# Patient Record
Sex: MALE | Race: WHITE | NOT HISPANIC OR LATINO | Employment: FULL TIME | URBAN - NONMETROPOLITAN AREA
[De-identification: names, ages, dates, MRNs, and addresses within clinical notes are randomized per-mention and may not be internally consistent; named-entity substitution may affect disease eponyms.]

---

## 2024-06-30 ENCOUNTER — APPOINTMENT (OUTPATIENT)
Dept: RADIOLOGY | Facility: CLINIC | Age: 34
End: 2024-06-30
Payer: COMMERCIAL

## 2024-06-30 ENCOUNTER — OFFICE VISIT (OUTPATIENT)
Dept: URGENT CARE | Facility: CLINIC | Age: 34
End: 2024-06-30
Payer: COMMERCIAL

## 2024-06-30 DIAGNOSIS — M25.511 ACUTE PAIN OF RIGHT SHOULDER: ICD-10-CM

## 2024-06-30 DIAGNOSIS — M25.511 ACUTE PAIN OF RIGHT SHOULDER: Primary | ICD-10-CM

## 2024-06-30 PROCEDURE — 73030 X-RAY EXAM OF SHOULDER: CPT

## 2024-06-30 PROCEDURE — 99213 OFFICE O/P EST LOW 20 MIN: CPT

## 2024-06-30 RX ORDER — METHYLPREDNISOLONE 4 MG/1
TABLET ORAL
Qty: 21 TABLET | Refills: 0 | Status: SHIPPED | OUTPATIENT
Start: 2024-06-30

## 2024-06-30 RX ORDER — LISDEXAMFETAMINE DIMESYLATE 60 MG/1
CAPSULE ORAL
COMMUNITY

## 2024-06-30 RX ORDER — ARIPIPRAZOLE 15 MG/1
15 TABLET ORAL EVERY MORNING
COMMUNITY

## 2024-06-30 RX ORDER — ESCITALOPRAM OXALATE 10 MG/1
10 TABLET ORAL EVERY MORNING
COMMUNITY

## 2024-06-30 RX ORDER — LISINOPRIL AND HYDROCHLOROTHIAZIDE 12.5; 1 MG/1; MG/1
1 TABLET ORAL DAILY
COMMUNITY
Start: 2024-03-04 | End: 2025-03-04
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
## 2024-06-30 NOTE — PATIENT INSTRUCTIONS
"Take prescribed medication as instructed.  Avoid ibuprofen while taking steroid.  May take Tylenol.  May resume ibuprofen/NSAIDs once done with steroid pack.  Continue icing 4-5 times a day.  Try to keep it mobile within your limits of pain to prevent stiffness and worsening of symptoms.  Follow-up with orthopedics as discussed.  Go to the emergency room if any symptoms worsen.  Follow up with PCP in 3-5 days.  Proceed to  ER if symptoms worsen.    If tests are performed, our office will contact you with results only if changes need to made to the care plan discussed with you at the visit. You can review your full results on [facility].  Patient Education     Shoulder pain   The Basics   Written by the doctors and editors at [facility]   What are the parts of the shoulder? -- The shoulder joint is made up of the upper arm bone, collarbone, and shoulder blade. It includes muscles, ligaments, tendons, and bursae (figure 1). All of these parts work together to help the shoulder move comfortably in different directions.  What can cause shoulder pain? -- Shoulder pain can happen when you damage or injure any of the different parts of the shoulder.  Different conditions can cause shoulder pain. They include:   Bursitis - This is a condition that can cause pain or swelling next to a joint. A \"bursa\" is a small fluid-filled sac that sits near a bone. It cushions and protects nearby tissues when they rub on or slide over bones. Bursitis is when a bursa gets irritated and swollen.   Frozen shoulder - This is a condition that causes the shoulder to be stiff, painful, and hard to move. If you have a frozen shoulder, the tissue around the shoulder joint gets thick and tight. This might happen after a shoulder gets injury or surgery.   Rotator cuff injury - The rotator cuff is made up of 4 shoulder muscles and their tendons. Tendons are strong bands of tissue that connect muscles to bones. Rotator cuff injuries cause pain " "in your shoulder and sometimes in your upper arm.   Shoulder impingement - This happens when a muscle, tendon, or bursa gets squeezed between bones.    shoulder - This happens when certain ligaments tear or get stretched too much. Ligaments are strong bands of tissue that connect bone to bone. The most common causes of a  shoulder are falling on the shoulder or getting hit in the shoulder. A  shoulder can be mild or severe, depending on how many ligaments are torn.   Osteoarthritis - This is a common condition that often comes with age. The cartilage in the joints wears down, causing the bones to rub against each other. This can cause pain, stiffness, and swelling in the shoulder joints.  Will I need tests? -- Maybe. Your doctor or nurse will talk with you and do an exam. They might also do an imaging test of your shoulder such as an X-ray, MRI, or ultrasound. Imaging tests create pictures of the inside of the body.  How is shoulder pain treated? -- Many causes of shoulder pain get better on their own. But it can take weeks to months to heal completely.  Your doctor might recommend:   Pain-relieving medicines called \"nonsteroidal anti-inflammatory drugs\" (NSAIDs) - These include ibuprofen (sample brand names: Advil, Motrin) and naproxen (sample brand name: Aleve). They can reduce pain and swelling.   Exercises and stretches - It can help to work with a physical therapist (exercise expert). They can teach you gentle stretches and exercises to help with your symptoms. Follow the physical therapist's advice for how often and when to do the exercises.   Steroid injections - Steroid medicines help reduce inflammation. Doctors can inject steroids into the shoulder to help reduce symptoms.   Surgery - Surgery might be an option if other treatments do not work and you have had symptoms for a long time.  Is there anything I can do on my own to feel better?    Rest your shoulder - Avoid lifting " things, reaching overhead or across your chest, or sleeping on the shoulder that hurts. If your doctor gave you a sling to support your arm, follow instructions for using it. Or you might get a bandage that goes around your shoulders and upper back instead.   Take medicine to reduce pain and swelling - To treat pain, you can take acetaminophen (sample brand name: Tylenol). To treat pain and swelling, you can take ibuprofen (sample brand names: Advil, Motrin).   Prop your shoulder on pillows - Keep it raised above the level of your heart. This can help with pain and swelling.   Ice your shoulder - Put a cold gel pack, bag of ice, or bag of frozen vegetables on the injured area every 1 to 2 hours, for 15 minutes each time. Put a thin towel between the ice (or other cold object) and your skin. Use the ice (or other cold object) for at least 6 hours after your injury. Some people find it helpful to ice longer, even up to 2 days after their injury. Ice can also be helpful after doing shoulder exercises.   Put heat on the area to reduce pain and stiffness - Do not use heat for more than 20 minutes at a time. Also, do not use anything too hot that could burn your skin.   Do pendulum exercises to keep your shoulder from getting too stiff (figure 2):   Let your arm relax and hang down while you sit or stand. Gently move your arm:   Back and forth at your side   Side to side across your body   Around in small circles   Do this exercise for 5 minutes, 1 or 2 times a day.   Start slowly, and make the exercises harder over time. For example, make small circles with your arm at first. Over time, make bigger circles or hold weights in your hand.   Your doctor, nurse, or physical therapist can show you how to do other exercises to strengthen the muscles around your shoulder. They will tell you when to start them and how often to do them.   Before exercising your shoulder, warm up the muscles first. You can do this by taking a hot  "shower or bath, or using a heating pad. Some soreness is normal. If you have sharp, tearing, or worsening pain, stop what you're doing and let your doctor or nurse know.  When should I call the doctor? -- Call for emergency help right away (in the [place] and [place], call 9-1-1) if:   You have shoulder pain and start to have trouble breathing or bad chest discomfort.  Call the doctor or nurse for advice if:   You have very bad pain that is not helped by medicines.   Your hand or arm becomes weak or swollen.   Your fingers are numb, tingly, or blue or gray in color.  All topics are updated as new evidence becomes available and our peer review process is complete.  This topic retrieved from [employer] on: Apr 25, 2024.  Topic 226913 Version 3.0  Release: 32.4.2 - C32.114  © 2024 [employer] and/or its affiliates. All rights reserved.  figure 1: Parts of the shoulder     These are the different parts of the shoulder as viewed from the front (A) and the back (B). The shoulder joint is made up of the upper arm bone, collarbone, and shoulder blade. Ligaments, muscles, and tendons help hold the joint in place and allow you to move your arm. A \"bursa\" is a small fluid-filled sac that sits near a bone. It cushions and protects nearby tissues when they rub on or slide over bones.  Graphic 210317 Version 1.0  figure 2: Pendulum exercises     Letyour arm relax and hang down while you sit or stand. Gently move your arm backand forth at your side, then side to side across your body, and then around insmall circles. Do this exercise for 5 minutes, 1 or 2 times a day. Youcan make this exercise harder by making bigger movements or holding a smallweight in your hand.  Graphic 531496 Version 1.0  Consumer Information Use and Disclaimer   Disclaimer: This generalized information is a limited summary of diagnosis, treatment, and/or medication information. It is not meant to be comprehensive and should be used as a tool to help the user " understand and/or assess potential diagnostic and treatment options. It does NOT include all information about conditions, treatments, medications, side effects, or risks that may apply to a specific patient. It is not intended to be medical advice or a substitute for the medical advice, diagnosis, or treatment of a health care provider based on the health care provider's examination and assessment of a patient's specific and unique circumstances. Patients must speak with a health care provider for complete information about their health, medical questions, and treatment options, including any risks or benefits regarding use of medications. This information does not endorse any treatments or medications as safe, effective, or approved for treating a specific patient. Up[employer]Date, Inc[employer] and its affiliates disclaim any warranty or liability relating to this information or the use thereof.The use of this information is governed by the Terms of Use, available at https://www.[employer].com/en/know/clinical-effectiveness-terms. 2024© Up[employer]Date, Inc. and its affiliates and/or licensors. All rights reserved.  Copyright   © 2024 [employer]Date, Inc. and/or its affiliates. All rights reserved.

## 2024-06-30 NOTE — LETTER
June 30, 2024     Patient: Jatinder Araiza   YOB: 1990   Date of Visit: 6/30/2024       To Whom it May Concern:    Jatinder Araiza was seen in my clinic on 6/30/2024. Please excuse from work on 7/1/2024.    If you have any questions or concerns, please don't hesitate to call.         Sincerely,          Eladio Vaca PA-C        CC: No Recipients

## 2025-02-04 ENCOUNTER — OFFICE VISIT (OUTPATIENT)
Dept: URGENT CARE | Facility: MEDICAL CENTER | Age: 35
End: 2025-02-04
Payer: COMMERCIAL

## 2025-02-04 ENCOUNTER — APPOINTMENT (OUTPATIENT)
Dept: URGENT CARE | Facility: MEDICAL CENTER | Age: 35
End: 2025-02-04
Payer: COMMERCIAL

## 2025-02-04 VITALS
HEART RATE: 98 BPM | OXYGEN SATURATION: 98 % | DIASTOLIC BLOOD PRESSURE: 98 MMHG | HEIGHT: 72 IN | TEMPERATURE: 97.3 F | WEIGHT: 221 LBS | RESPIRATION RATE: 18 BRPM | SYSTOLIC BLOOD PRESSURE: 139 MMHG | BODY MASS INDEX: 29.93 KG/M2

## 2025-02-04 DIAGNOSIS — A08.4 VIRAL GASTROENTERITIS: Primary | ICD-10-CM

## 2025-02-04 PROCEDURE — 99213 OFFICE O/P EST LOW 20 MIN: CPT

## 2025-02-04 NOTE — LETTER
February 4, 2025     Patient: Jatinder Araiza   YOB: 1990   Date of Visit: 2/4/2025       To Whom it May Concern:    Jatinder Araiza was seen in my clinic on 2/4/2025. He may return to work on 02/06/2025 provided his GI symptoms have improved/resolved .    If you have any questions or concerns, please don't hesitate to call.         Sincerely,          LEIGHTON Sales        CC: No Recipients

## 2025-02-04 NOTE — PROGRESS NOTES
[facility]        NAME: Jatinder Araiza is a 35 y.o. male  : 1990    MRN: [id number]  DATE: 2025  TIME: 3:58 PM    Assessment and Plan   Viral gastroenteritis [A08.4]  1. Viral gastroenteritis              Patient Instructions       Follow up with PCP in 3-5 days.  Proceed to  ER if symptoms worsen.    If tests are performed, our office will contact you with results only if changes need to made to the care plan discussed with you at the visit. [facility]    Chief Complaint     Chief Complaint   Patient presents with    Generalized Body Aches     Pt complains of symptoms for 3 days, Body aches, vomiting, diarrhea         History of Present Illness       Patient presents with generalized body aches, fatigue x3-4 days. Last night started with vomiting. He has not had any vomiting since yesterday. He has not had any noted fevers, but has been sweating. At home he has taking zofran which did not help his nausea and he also took ibuprofen for the body aches. He reports his daughter has had vomiting and diarrhea x3 days. Son also recently been having some nausea as well. No recent travel. No recent Abx use. He has been drinking fluids mostly water, but has not tried to eat anything.       Symptoms consistent with norovirus type illness.  Given recent increase in incidences locally discussed importance of handwashing and cleaning.  Discussed importance of increased oral intake of fluids and closely monitoring of symptoms. Strict ER precautions also discussed. Work noted provided to have today and tomorrow off with expectations that should symptoms lack improvement he should consider re-evaluation.        Review of Systems   Review of Systems   Constitutional:  Positive for appetite change and fatigue. Negative for chills and fever.   HENT:  Negative for congestion, postnasal drip, rhinorrhea, sinus pressure, sinus pain, sore throat and trouble  swallowing.    Respiratory:  Negative for cough and shortness of breath.    Gastrointestinal:  Positive for abdominal pain, nausea and vomiting. Negative for constipation and diarrhea.        Last night had some abdominal discomfort.    Musculoskeletal:  Positive for myalgias.   Skin:  Negative for color change and rash.   Neurological:  Positive for headaches. Negative for dizziness and light-headedness.         Current Medications       Current Outpatient Medications:     ARIPiprazole (ABILIFY) 15 mg tablet, Take 15 mg by mouth every morning (Patient not taking: Reported on 2/4/2025), Disp: , Rfl:     escitalopram (LEXAPRO) 10 mg tablet, Take 10 mg by mouth every morning (Patient not taking: Reported on 2/4/2025), Disp: , Rfl:     lisdexamfetamine (VYVANSE) 60 MG capsule, take 1 capsule by mouth every day in the morning (Patient not taking: Reported on 2/4/2025), Disp: , Rfl:     lisinopril-hydrochlorothiazide (PRINZIDE,ZESTORETIC) 10-12.5 MG per tablet, Take 1 tablet by mouth daily (Patient not taking: Reported on 2/4/2025), Disp: , Rfl:     methylPREDNISolone 4 MG tablet therapy pack, Use as directed on package (Patient not taking: Reported on 2/4/2025), Disp: 21 tablet, Rfl: 0    Current Allergies     Allergies as of 02/04/2025 - Reviewed 02/04/2025   Allergen Reaction Noted    Pollen extract Rash 02/13/2023    Ziprasidone Rash 06/25/2018            The following portions of the patient's history were reviewed and updated as appropriate: allergies, current medications, past family history, past medical history, past social history, past surgical history and problem list.     History reviewed. No pertinent past medical history.    History reviewed. No pertinent surgical history.    History reviewed. No pertinent family history.      Medications have been verified.        Objective   /98   Pulse 98   Temp (!) 97.3 °F (36.3 °C)   Resp 18   Ht 6' (1.829 m)   Wt 100 kg (221 lb)   SpO2 98%   BMI 29.97  kg/m²        Physical Exam     Physical Exam  Vitals and nursing note reviewed.   Constitutional:       General: He is awake. He is not in acute distress.     Appearance: Normal appearance. He is well-developed, well-groomed and normal weight. He is not ill-appearing.   HENT:      Head: Normocephalic and atraumatic.      Nose: Nose normal.      Mouth/Throat:      Lips: Pink.      Mouth: Mucous membranes are moist.   Eyes:      Extraocular Movements: Extraocular movements intact.      Conjunctiva/sclera: Conjunctivae normal.      Pupils: Pupils are equal, round, and reactive to light.   Cardiovascular:      Rate and Rhythm: Normal rate and regular rhythm.      Pulses: Normal pulses.      Heart sounds: Normal heart sounds.   Pulmonary:      Effort: Pulmonary effort is normal.      Breath sounds: Normal breath sounds. No decreased breath sounds, wheezing, rhonchi or rales.   Abdominal:      General: Abdomen is flat. Bowel sounds are increased.      Palpations: Abdomen is soft.      Tenderness: There is no abdominal tenderness.   Musculoskeletal:         General: Normal range of motion.      Cervical back: Full passive range of motion without pain, normal range of motion and neck supple.   Skin:     General: Skin is warm and dry.      Capillary Refill: Capillary refill takes less than 2 seconds.      Findings: No rash.   Neurological:      General: No focal deficit present.      Mental Status: He is alert and oriented to person, place, and time. Mental status is at baseline.      GCS: GCS eye subscore is 4. GCS verbal subscore is 5. GCS motor subscore is 6.   Psychiatric:         Mood and Affect: Mood normal.         Behavior: Behavior normal. Behavior is cooperative.

## 2025-03-08 ENCOUNTER — OFFICE VISIT (OUTPATIENT)
Dept: URGENT CARE | Facility: MEDICAL CENTER | Age: 35
End: 2025-03-08
Payer: COMMERCIAL

## 2025-03-08 VITALS
SYSTOLIC BLOOD PRESSURE: 138 MMHG | TEMPERATURE: 97.7 F | OXYGEN SATURATION: 98 % | RESPIRATION RATE: 14 BRPM | HEIGHT: 72 IN | HEART RATE: 86 BPM | WEIGHT: 225.4 LBS | BODY MASS INDEX: 30.53 KG/M2 | DIASTOLIC BLOOD PRESSURE: 78 MMHG

## 2025-03-08 DIAGNOSIS — Z20.818 EXPOSURE TO STREP THROAT: ICD-10-CM

## 2025-03-08 DIAGNOSIS — J02.9 SORE THROAT: Primary | ICD-10-CM

## 2025-03-08 PROBLEM — K76.0 FATTY LIVER: Status: ACTIVE | Noted: 2022-07-25

## 2025-03-08 PROBLEM — I10 PRIMARY HYPERTENSION: Status: ACTIVE | Noted: 2024-07-01

## 2025-03-08 PROBLEM — M54.50 CHRONIC MIDLINE LOW BACK PAIN WITHOUT SCIATICA: Status: ACTIVE | Noted: 2024-11-05

## 2025-03-08 PROBLEM — G89.29 CHRONIC MIDLINE LOW BACK PAIN WITHOUT SCIATICA: Status: ACTIVE | Noted: 2024-11-05

## 2025-03-08 PROBLEM — F31.12 BIPOLAR 1 DISORDER WITH MODERATE MANIA (HCC): Status: ACTIVE | Noted: 2020-03-18

## 2025-03-08 PROBLEM — F19.11 HISTORY OF SUBSTANCE ABUSE (HCC): Status: ACTIVE | Noted: 2024-11-05

## 2025-03-08 PROBLEM — F41.1 GENERALIZED ANXIETY DISORDER: Status: ACTIVE | Noted: 2020-03-18

## 2025-03-08 LAB — S PYO AG THROAT QL: NEGATIVE

## 2025-03-08 PROCEDURE — 99213 OFFICE O/P EST LOW 20 MIN: CPT

## 2025-03-08 PROCEDURE — 87880 STREP A ASSAY W/OPTIC: CPT

## 2025-03-08 PROCEDURE — 87070 CULTURE OTHR SPECIMN AEROBIC: CPT

## 2025-03-08 NOTE — PATIENT INSTRUCTIONS
You may take over the counter Tylenol (Acetaminophen) and/or Motrin (Ibuprofen) as needed, as directed on packaging. Be sure to get plenty of rest, and drinking fluids to remain hydrated.     Please follow up with your primary provider in the next several days. Should you have any worsening of symptoms, or lack of improvement please be re-evaluated. If needed for significant concerns, consider 911 or ER evaluation.     The unnecessary use of antibiotics can have harmful affect, unwanted side-effects and can lead to antibiotic resistant bacteria in the future. You are being treated today for a viral illness. Viral illnesses do not require antibiotics, and are treated symptomatically.   According to the Centers for Disease Control and Prevention, about one-third of antibiotic use in the outpatient setting, is not needed nor appropriate. Antibiotics treat infections caused by bacteria. But they don't treat infections caused by viruses (viral infections). For example, an antibiotic is the correct treatment for strep throat, which is caused by bacteria. But it's not the right treatment for most sore throats, which are caused by viruses.By being proactive and treating your individual symptoms, this may help you feel better.     You may have had testing done today which when completed and resulted may change the course of your treatment. It is at that time that if a change in your treatment is necessary that you will hear from our office. I would also recommend you follow up with your primary care provider in the next few days.

## 2025-03-08 NOTE — PROGRESS NOTES
[facility]        NAME: Jatinder Araiza is a 35 y.o. male  : 1990    MRN: [id number]  DATE: 2025  TIME: 9:15 AM    Assessment and Plan   Sore throat [J02.9]  1. Sore throat  POCT rapid ANTIGEN strepA    Throat culture    Throat culture      2. Exposure to strep throat  Throat culture    Throat culture            Patient Instructions       Follow up with PCP in 3-5 days.  Proceed to  ER if symptoms worsen.    If tests are performed, our office will contact you with results only if changes need to made to the care plan discussed with you at the visit. You can review your full results on [facility].    Chief Complaint     Chief Complaint   Patient presents with   • Sore Throat     Was exposed to strep - daughter tested positive Wednesday. Sore throat.         History of Present Illness       Patient presents with sore throat with reports of 14yo daughter recently testing positive for strep. Patient started yesterday with sore throat. He has not had any fevers. Has had some discomfort in b/l ears, but denies any other URI symptoms. At home he has not taken anything for his symptoms.         Review of Systems   Review of Systems   Constitutional:  Negative for chills and fever.   HENT:  Positive for sore throat. Negative for congestion, rhinorrhea and trouble swallowing.    Respiratory:  Negative for cough and shortness of breath.    Gastrointestinal:  Negative for abdominal pain, constipation, diarrhea, nausea and vomiting.   Skin:  Negative for color change and rash.   Neurological:  Negative for dizziness, light-headedness and headaches.         Current Medications     No current outpatient medications on file.    Current Allergies     Allergies as of 2025 - Reviewed 2025   Allergen Reaction Noted   • Pollen extract Rash 2023   • Ziprasidone Rash 2018            The following portions of the patient's history were reviewed and updated as appropriate: allergies,  current medications, past family history, past medical history, past social history, past surgical history and problem list.     Past Medical History:   Diagnosis Date   • Hypertension        History reviewed. No pertinent surgical history.    Family History   Problem Relation Age of Onset   • Cancer Mother    • No Known Problems Father          Medications have been verified.        Objective   /78   Pulse 86   Temp 97.7 °F (36.5 °C)   Resp 14   Ht 6' (1.829 m)   Wt 102 kg (225 lb 6.4 oz)   SpO2 98%   BMI 30.57 kg/m²        Physical Exam     Physical Exam  Vitals and nursing note reviewed.   Constitutional:       General: He is awake. He is not in acute distress.     Appearance: Normal appearance. He is well-developed and normal weight. He is not ill-appearing.   HENT:      Head: Normocephalic and atraumatic.      Right Ear: Tympanic membrane, ear canal and external ear normal.      Left Ear: Tympanic membrane, ear canal and external ear normal.      Nose: Nose normal.      Mouth/Throat:      Lips: Pink.      Mouth: Mucous membranes are moist.      Pharynx: Oropharynx is clear. Uvula midline. Posterior oropharyngeal erythema present. No pharyngeal swelling, oropharyngeal exudate or postnasal drip.      Tonsils: No tonsillar exudate. 0 on the right. 0 on the left.   Eyes:      Extraocular Movements: Extraocular movements intact.      Conjunctiva/sclera: Conjunctivae normal.      Pupils: Pupils are equal, round, and reactive to light.   Cardiovascular:      Rate and Rhythm: Normal rate and regular rhythm.      Pulses: Normal pulses.      Heart sounds: Normal heart sounds.   Pulmonary:      Effort: Pulmonary effort is normal.      Breath sounds: Normal breath sounds.   Abdominal:      General: Abdomen is flat. Bowel sounds are normal.      Palpations: Abdomen is soft.   Musculoskeletal:         General: Normal range of motion.      Cervical back: Full passive range of motion without pain, normal range of  motion and neck supple.   Skin:     General: Skin is warm and dry.      Capillary Refill: Capillary refill takes less than 2 seconds.      Findings: No rash.   Neurological:      General: No focal deficit present.      Mental Status: He is alert and oriented to person, place, and time. Mental status is at baseline.   Psychiatric:         Mood and Affect: Mood normal.         Behavior: Behavior normal. Behavior is cooperative.

## 2025-03-09 LAB — BACTERIA THROAT CULT: NORMAL

## 2025-03-10 ENCOUNTER — RESULTS FOLLOW-UP (OUTPATIENT)
Dept: URGENT CARE | Facility: MEDICAL CENTER | Age: 35
End: 2025-03-10

## 2025-03-10 LAB — BACTERIA THROAT CULT: NORMAL

## 2025-03-19 ENCOUNTER — APPOINTMENT (OUTPATIENT)
Dept: URBAN - NONMETROPOLITAN AREA CLINIC 4 | Facility: CLINIC | Age: 35
Setting detail: DERMATOLOGY
End: 2025-03-19

## 2025-03-19 DIAGNOSIS — B07.8 OTHER VIRAL WARTS: ICD-10-CM

## 2025-03-19 PROCEDURE — 17111 DESTRUCTION B9 LESIONS 15/>: CPT

## 2025-03-19 PROCEDURE — ? COUNSELING

## 2025-03-19 PROCEDURE — ? PHOTO-DOCUMENTATION

## 2025-03-19 PROCEDURE — ? LIQUID NITROGEN

## 2025-03-19 ASSESSMENT — LOCATION SIMPLE DESCRIPTION DERM
LOCATION SIMPLE: LEFT INDEX FINGER
LOCATION SIMPLE: RIGHT RING FINGER
LOCATION SIMPLE: RIGHT MIDDLE FINGER
LOCATION SIMPLE: LEFT RING FINGER
LOCATION SIMPLE: LEFT SMALL FINGER
LOCATION SIMPLE: LEFT MIDDLE FINGER
LOCATION SIMPLE: LEFT HAND
LOCATION SIMPLE: RIGHT SMALL FINGER
LOCATION SIMPLE: RIGHT HAND

## 2025-03-19 ASSESSMENT — LOCATION DETAILED DESCRIPTION DERM
LOCATION DETAILED: 2ND WEB SPACE LEFT HAND
LOCATION DETAILED: LEFT MID DORSAL INDEX FINGER
LOCATION DETAILED: RIGHT MID RADIAL DORSAL MIDDLE FINGER
LOCATION DETAILED: RIGHT MIDDLE PROXIMAL INTERPHALANGEAL JOINT
LOCATION DETAILED: LEFT PROXIMAL DORSAL INDEX FINGER
LOCATION DETAILED: LEFT PROXIMAL DORSAL RING FINGER
LOCATION DETAILED: LEFT RADIAL DORSAL HAND
LOCATION DETAILED: LEFT DORSAL MIDDLE METACARPOPHALANGEAL JOINT
LOCATION DETAILED: LEFT MIDDLE PROXIMAL INTERPHALANGEAL JOINT
LOCATION DETAILED: RIGHT SMALL PROXIMAL INTERPHALANGEAL JOINT
LOCATION DETAILED: RIGHT ULNAR DORSAL HAND
LOCATION DETAILED: LEFT DORSAL RING METACARPOPHALANGEAL JOINT
LOCATION DETAILED: LEFT PROXIMAL DORSAL MIDDLE FINGER
LOCATION DETAILED: LEFT PROXIMAL ULNAR DORSAL INDEX FINGER
LOCATION DETAILED: 3RD WEB SPACE LEFT HAND
LOCATION DETAILED: LEFT DORSAL SMALL METACARPOPHALANGEAL JOINT
LOCATION DETAILED: LEFT PROXIMAL ULNAR DORSAL SMALL FINGER
LOCATION DETAILED: LEFT DORSAL INDEX METACARPOPHALANGEAL JOINT
LOCATION DETAILED: RIGHT RING PROXIMAL INTERPHALANGEAL JOINT

## 2025-03-19 ASSESSMENT — TOTAL NUMBER OF VERRUCA VULGARIS: # OF LESIONS?: 23

## 2025-03-19 ASSESSMENT — LOCATION ZONE DERM
LOCATION ZONE: FINGER
LOCATION ZONE: HAND

## 2025-03-19 NOTE — PROCEDURE: LIQUID NITROGEN
Show Topical Anesthesia Variable?: Yes
Medical Necessity Information: It is in your best interest to select a reason for this procedure from the list below. All of these items fulfill various CMS LCD requirements except the new and changing color options.
Include Z78.9 (Other Specified Conditions Influencing Health Status) As An Associated Diagnosis?: No
Medical Necessity Clause: This procedure was medically necessary because the lesions that were treated were:
Number Of Freeze-Thaw Cycles: 1 freeze-thaw cycle
Duration Of Freeze Thaw-Cycle (Seconds): 5-10
Detail Level: Zone
Spray Paint Text: The liquid nitrogen was applied to the skin utilizing a spray paint frosting technique.
Post-Care Instructions: I reviewed with the patient in detail post-care instructions. Patient is to wear sunprotection, and avoid picking at any of the treated lesions. Pt may apply Vaseline to crusted or scabbing areas.
Consent: The patient's consent was obtained including but not limited to risks of crusting, scabbing, blistering, scarring, darker or lighter pigmentary change, recurrence, incomplete removal and infection.